# Patient Record
Sex: MALE | Race: ASIAN | NOT HISPANIC OR LATINO | URBAN - METROPOLITAN AREA
[De-identification: names, ages, dates, MRNs, and addresses within clinical notes are randomized per-mention and may not be internally consistent; named-entity substitution may affect disease eponyms.]

---

## 2019-11-29 ENCOUNTER — EMERGENCY (EMERGENCY)
Facility: HOSPITAL | Age: 44
LOS: 1 days | Discharge: HOME | End: 2019-11-29
Attending: EMERGENCY MEDICINE | Admitting: EMERGENCY MEDICINE
Payer: COMMERCIAL

## 2019-11-29 VITALS
DIASTOLIC BLOOD PRESSURE: 144 MMHG | OXYGEN SATURATION: 98 % | RESPIRATION RATE: 16 BRPM | TEMPERATURE: 98 F | HEART RATE: 77 BPM | SYSTOLIC BLOOD PRESSURE: 213 MMHG

## 2019-11-29 LAB
ALBUMIN SERPL ELPH-MCNC: 4.4 G/DL — SIGNIFICANT CHANGE UP (ref 3.3–5)
ALP SERPL-CCNC: 72 U/L — SIGNIFICANT CHANGE UP (ref 40–120)
ALT FLD-CCNC: 22 U/L — SIGNIFICANT CHANGE UP (ref 4–41)
ANION GAP SERPL CALC-SCNC: 12 MMO/L — SIGNIFICANT CHANGE UP (ref 7–14)
APPEARANCE UR: CLEAR — SIGNIFICANT CHANGE UP
AST SERPL-CCNC: 16 U/L — SIGNIFICANT CHANGE UP (ref 4–40)
BASOPHILS # BLD AUTO: 0.06 K/UL — SIGNIFICANT CHANGE UP (ref 0–0.2)
BASOPHILS NFR BLD AUTO: 0.7 % — SIGNIFICANT CHANGE UP (ref 0–2)
BILIRUB SERPL-MCNC: 0.5 MG/DL — SIGNIFICANT CHANGE UP (ref 0.2–1.2)
BILIRUB UR-MCNC: NEGATIVE — SIGNIFICANT CHANGE UP
BLOOD UR QL VISUAL: NEGATIVE — SIGNIFICANT CHANGE UP
BUN SERPL-MCNC: 24 MG/DL — HIGH (ref 7–23)
CALCIUM SERPL-MCNC: 9.1 MG/DL — SIGNIFICANT CHANGE UP (ref 8.4–10.5)
CHLORIDE SERPL-SCNC: 103 MMOL/L — SIGNIFICANT CHANGE UP (ref 98–107)
CO2 SERPL-SCNC: 24 MMOL/L — SIGNIFICANT CHANGE UP (ref 22–31)
COLOR SPEC: SIGNIFICANT CHANGE UP
CREAT SERPL-MCNC: 1.09 MG/DL — SIGNIFICANT CHANGE UP (ref 0.5–1.3)
EOSINOPHIL # BLD AUTO: 0.26 K/UL — SIGNIFICANT CHANGE UP (ref 0–0.5)
EOSINOPHIL NFR BLD AUTO: 3.1 % — SIGNIFICANT CHANGE UP (ref 0–6)
GLUCOSE SERPL-MCNC: 96 MG/DL — SIGNIFICANT CHANGE UP (ref 70–99)
GLUCOSE UR-MCNC: NEGATIVE — SIGNIFICANT CHANGE UP
HBA1C BLD-MCNC: 5.7 % — HIGH (ref 4–5.6)
HCT VFR BLD CALC: 44.5 % — SIGNIFICANT CHANGE UP (ref 39–50)
HGB BLD-MCNC: 14.7 G/DL — SIGNIFICANT CHANGE UP (ref 13–17)
IMM GRANULOCYTES NFR BLD AUTO: 0.2 % — SIGNIFICANT CHANGE UP (ref 0–1.5)
KETONES UR-MCNC: NEGATIVE — SIGNIFICANT CHANGE UP
LEUKOCYTE ESTERASE UR-ACNC: NEGATIVE — SIGNIFICANT CHANGE UP
LYMPHOCYTES # BLD AUTO: 2.7 K/UL — SIGNIFICANT CHANGE UP (ref 1–3.3)
LYMPHOCYTES # BLD AUTO: 32.3 % — SIGNIFICANT CHANGE UP (ref 13–44)
MCHC RBC-ENTMCNC: 28.7 PG — SIGNIFICANT CHANGE UP (ref 27–34)
MCHC RBC-ENTMCNC: 33 % — SIGNIFICANT CHANGE UP (ref 32–36)
MCV RBC AUTO: 86.7 FL — SIGNIFICANT CHANGE UP (ref 80–100)
MONOCYTES # BLD AUTO: 0.63 K/UL — SIGNIFICANT CHANGE UP (ref 0–0.9)
MONOCYTES NFR BLD AUTO: 7.5 % — SIGNIFICANT CHANGE UP (ref 2–14)
NEUTROPHILS # BLD AUTO: 4.68 K/UL — SIGNIFICANT CHANGE UP (ref 1.8–7.4)
NEUTROPHILS NFR BLD AUTO: 56.2 % — SIGNIFICANT CHANGE UP (ref 43–77)
NITRITE UR-MCNC: NEGATIVE — SIGNIFICANT CHANGE UP
NRBC # FLD: 0 K/UL — SIGNIFICANT CHANGE UP (ref 0–0)
PH UR: 5.5 — SIGNIFICANT CHANGE UP (ref 5–8)
PLATELET # BLD AUTO: 290 K/UL — SIGNIFICANT CHANGE UP (ref 150–400)
PMV BLD: 9.2 FL — SIGNIFICANT CHANGE UP (ref 7–13)
POTASSIUM SERPL-MCNC: 3.8 MMOL/L — SIGNIFICANT CHANGE UP (ref 3.5–5.3)
POTASSIUM SERPL-SCNC: 3.8 MMOL/L — SIGNIFICANT CHANGE UP (ref 3.5–5.3)
PROT SERPL-MCNC: 7.9 G/DL — SIGNIFICANT CHANGE UP (ref 6–8.3)
PROT UR-MCNC: 10 — SIGNIFICANT CHANGE UP
RBC # BLD: 5.13 M/UL — SIGNIFICANT CHANGE UP (ref 4.2–5.8)
RBC # FLD: 13.2 % — SIGNIFICANT CHANGE UP (ref 10.3–14.5)
SODIUM SERPL-SCNC: 139 MMOL/L — SIGNIFICANT CHANGE UP (ref 135–145)
SP GR SPEC: 1.02 — SIGNIFICANT CHANGE UP (ref 1–1.04)
TROPONIN T, HIGH SENSITIVITY: 17 NG/L — SIGNIFICANT CHANGE UP (ref ?–14)
TROPONIN T, HIGH SENSITIVITY: 18 NG/L — SIGNIFICANT CHANGE UP (ref ?–14)
UROBILINOGEN FLD QL: NORMAL — SIGNIFICANT CHANGE UP
WBC # BLD: 8.35 K/UL — SIGNIFICANT CHANGE UP (ref 3.8–10.5)
WBC # FLD AUTO: 8.35 K/UL — SIGNIFICANT CHANGE UP (ref 3.8–10.5)

## 2019-11-29 PROCEDURE — 99218: CPT

## 2019-11-29 PROCEDURE — 78452 HT MUSCLE IMAGE SPECT MULT: CPT | Mod: 26

## 2019-11-29 PROCEDURE — 93016 CV STRESS TEST SUPVJ ONLY: CPT | Mod: GC

## 2019-11-29 PROCEDURE — 93018 CV STRESS TEST I&R ONLY: CPT | Mod: GC

## 2019-11-29 PROCEDURE — 71046 X-RAY EXAM CHEST 2 VIEWS: CPT | Mod: 26

## 2019-11-29 RX ORDER — ACETAMINOPHEN 500 MG
975 TABLET ORAL ONCE
Refills: 0 | Status: COMPLETED | OUTPATIENT
Start: 2019-11-29 | End: 2019-11-29

## 2019-11-29 RX ORDER — NIFEDIPINE 30 MG
30 TABLET, EXTENDED RELEASE 24 HR ORAL ONCE
Refills: 0 | Status: COMPLETED | OUTPATIENT
Start: 2019-11-29 | End: 2019-11-29

## 2019-11-29 RX ORDER — NIFEDIPINE 30 MG
1 TABLET, EXTENDED RELEASE 24 HR ORAL
Qty: 14 | Refills: 0
Start: 2019-11-29 | End: 2019-12-12

## 2019-11-29 RX ORDER — HYDROCHLOROTHIAZIDE 25 MG
12.5 TABLET ORAL ONCE
Refills: 0 | Status: COMPLETED | OUTPATIENT
Start: 2019-11-29 | End: 2019-11-29

## 2019-11-29 RX ORDER — LOSARTAN POTASSIUM 100 MG/1
100 TABLET, FILM COATED ORAL ONCE
Refills: 0 | Status: COMPLETED | OUTPATIENT
Start: 2019-11-29 | End: 2019-11-29

## 2019-11-29 RX ORDER — LOSARTAN/HYDROCHLOROTHIAZIDE 100MG-25MG
1 TABLET ORAL
Qty: 14 | Refills: 0
Start: 2019-11-29 | End: 2019-12-12

## 2019-11-29 RX ORDER — SODIUM CHLORIDE 9 MG/ML
1000 INJECTION INTRAMUSCULAR; INTRAVENOUS; SUBCUTANEOUS ONCE
Refills: 0 | Status: COMPLETED | OUTPATIENT
Start: 2019-11-29 | End: 2019-11-29

## 2019-11-29 RX ORDER — DIPHENHYDRAMINE HCL 50 MG
25 CAPSULE ORAL ONCE
Refills: 0 | Status: COMPLETED | OUTPATIENT
Start: 2019-11-29 | End: 2019-11-29

## 2019-11-29 RX ORDER — LIDOCAINE 4 G/100G
1 CREAM TOPICAL ONCE
Refills: 0 | Status: COMPLETED | OUTPATIENT
Start: 2019-11-29 | End: 2019-11-29

## 2019-11-29 RX ORDER — HYDROCHLOROTHIAZIDE 25 MG
12.5 TABLET ORAL DAILY
Refills: 0 | Status: DISCONTINUED | OUTPATIENT
Start: 2019-11-29 | End: 2019-11-30

## 2019-11-29 RX ORDER — ACETAMINOPHEN 500 MG
650 TABLET ORAL ONCE
Refills: 0 | Status: COMPLETED | OUTPATIENT
Start: 2019-11-29 | End: 2019-11-29

## 2019-11-29 RX ORDER — LOSARTAN POTASSIUM 100 MG/1
100 TABLET, FILM COATED ORAL DAILY
Refills: 0 | Status: DISCONTINUED | OUTPATIENT
Start: 2019-11-29 | End: 2019-12-30

## 2019-11-29 RX ORDER — DIAZEPAM 5 MG
5 TABLET ORAL ONCE
Refills: 0 | Status: DISCONTINUED | OUTPATIENT
Start: 2019-11-29 | End: 2019-11-29

## 2019-11-29 RX ORDER — ASPIRIN/CALCIUM CARB/MAGNESIUM 324 MG
162 TABLET ORAL ONCE
Refills: 0 | Status: COMPLETED | OUTPATIENT
Start: 2019-11-29 | End: 2019-11-29

## 2019-11-29 RX ORDER — HYDRALAZINE HCL 50 MG
10 TABLET ORAL ONCE
Refills: 0 | Status: COMPLETED | OUTPATIENT
Start: 2019-11-29 | End: 2019-11-29

## 2019-11-29 RX ORDER — METOCLOPRAMIDE HCL 10 MG
10 TABLET ORAL ONCE
Refills: 0 | Status: COMPLETED | OUTPATIENT
Start: 2019-11-29 | End: 2019-11-29

## 2019-11-29 RX ADMIN — LIDOCAINE 1 PATCH: 4 CREAM TOPICAL at 04:36

## 2019-11-29 RX ADMIN — LIDOCAINE 1 PATCH: 4 CREAM TOPICAL at 08:15

## 2019-11-29 RX ADMIN — LOSARTAN POTASSIUM 100 MILLIGRAM(S): 100 TABLET, FILM COATED ORAL at 07:08

## 2019-11-29 RX ADMIN — Medication 5 MILLIGRAM(S): at 04:36

## 2019-11-29 RX ADMIN — Medication 12.5 MILLIGRAM(S): at 08:35

## 2019-11-29 RX ADMIN — Medication 10 MILLIGRAM(S): at 10:51

## 2019-11-29 RX ADMIN — Medication 30 MILLIGRAM(S): at 19:21

## 2019-11-29 RX ADMIN — Medication 25 MILLIGRAM(S): at 18:50

## 2019-11-29 RX ADMIN — SODIUM CHLORIDE 1000 MILLILITER(S): 9 INJECTION INTRAMUSCULAR; INTRAVENOUS; SUBCUTANEOUS at 17:20

## 2019-11-29 RX ADMIN — Medication 975 MILLIGRAM(S): at 04:36

## 2019-11-29 RX ADMIN — Medication 12.5 MILLIGRAM(S): at 22:37

## 2019-11-29 RX ADMIN — LIDOCAINE 1 PATCH: 4 CREAM TOPICAL at 10:53

## 2019-11-29 RX ADMIN — Medication 975 MILLIGRAM(S): at 06:57

## 2019-11-29 RX ADMIN — Medication 10 MILLIGRAM(S): at 18:30

## 2019-11-29 RX ADMIN — Medication 650 MILLIGRAM(S): at 16:35

## 2019-11-29 RX ADMIN — Medication 650 MILLIGRAM(S): at 17:10

## 2019-11-29 RX ADMIN — Medication 162 MILLIGRAM(S): at 04:37

## 2019-11-29 NOTE — ED ADULT TRIAGE NOTE - CHIEF COMPLAINT QUOTE
Patient complains of high blood pressure (sbp 200s at home), in setting of posterior neck pain x 2 weeks.  Patient states had chest pain x1 episode yesterday, but none at present.  Denies headache. PHx of HTN on Losartan-HCTZ 100mg, states he is compliant and no recent change in prescription.  /144 in triage.  EKG obtained.

## 2019-11-29 NOTE — ED CDU PROVIDER INITIAL DAY NOTE - OBJECTIVE STATEMENT
45 yo M PMH of HTN take Losartan/HCTZ 100mg p/w elevated BP with intermittent left sided neck pain x 1 week and non exertion L. chest pain with SOB x 1 day. Pt reports that his neck pain is not reproducible but states that due to his job as a  his neck is placed in bad positions for along period of time. Pt report that his CP is nonradiating and sharp, not associated with inspiration, position, exertion or food consumption.  Pt state that his when he took his Bp yesterday it was elevated from 185 to 210 systolic with his diastolic around 115. Pt baseline BP is stated to be 150/85.  Pt denies nausea, vomiting, weakness, fatigue, lightheadedness.  Pt has had no recent long trips, surgery, trauma, denies hemoptysis, and has no hx of DVT/PE.  Pt also denies fevers, chills cough.   PCP: Dr. Salcido    CDU NATACHA Little: Agree with above note: 45 yo M with htn, here with htn and left neck pain and left cp, position, worst with movement, non exertional. mild associated sob. no SAMANO. x 1 day. trop 17,18, EKG TWI I, avl, v4,5,6, no previous ekg. Fam h/o CAD stents in father. CDU for tele stress, tele doc of day.

## 2019-11-29 NOTE — ED CDU PROVIDER INITIAL DAY NOTE - PROGRESS NOTE DETAILS
During pts stay, was called by stress lab that patients BP was too elevated to perform stress. In the CDU was given medications and BP improved was able to have stress. Seen by Dr. Bassett who recommended outpatient follow up as stress was normal. recommends increasing hctz to 20 mg and adding on nifedipine 30mg to help control BP outpatient. all copies of reports given to patient, advised close followup. Strict return precautions given. Patient c/o throbbing headache. Neuro exam intact. Plan for reglan and reassess.

## 2019-11-29 NOTE — ED CDU PROVIDER INITIAL DAY NOTE - ATTENDING CONTRIBUTION TO CARE
Patient is a 43 yo M with hx of HTN sent to CDU for stress test secondary to family history of CAD, ekg with twi in lateral leads and left sided chest pain. Pain is worse with laying on left side. Denies exertional chest pain, shortness of breath, lower extremity swelling. No travel. No history of DVT/ PE. Denies fevers, chills, cough. Patient also c/o left sided neck pain.     VS noted  Gen. no acute distress, Non toxic   HEENT: EOMI, mmm, mild left paracervical tenderness on left, supple neck, full ROM  Lungs: CTAB/L no C/ W /R   CVS: RRR   Abd; Soft non tender, non distended   Ext: no edema, no calf tenderness  Skin: no rash  Neuro AAOx3, CN 2-12 intact, strength equal in b/l UE/LE, clear speech  a/p: chest pain, abnormal ekg, fam hx of CAD - plan for stress test, tele doc evaluation.   - Betty MCLEAN

## 2019-11-29 NOTE — ED CDU PROVIDER INITIAL DAY NOTE - MEDICAL DECISION MAKING DETAILS
43 yo M with htn, here with htn and left neck pain and left cp, position, worst with movement, non exertional. mild associated sob. no SAMANO. x 1 day. trop 17,18, EKG TWI I, avl, v4,5,6, no previous ekg. Fam h/o CAD stents in father. CDU for tele stress, tele doc of day. no active pain, neck pain improved.

## 2019-11-29 NOTE — ED ADULT NURSE NOTE - OBJECTIVE STATEMENT
Pt arrives for hypertension, stating it was in the 200s systolic at home. He also reports left sided chest pain only upon palpation, left neck pain, SOB. Denies dizziness/nausea/vomiting.  Reports Hx hypertension and states he takes his medication as prescribed. Pt placed on monitor. Father at bedside. Will continue to monitor

## 2019-11-29 NOTE — ED PROVIDER NOTE - NS ED ROS FT
GENERAL: No fever or chills, EYES: no change in vision, HEENT: + neck pain, no trouble speaking, CARDIAC: + chest pain, palpitation PULMONARY: no cough or + OB, GI: no abdominal pain, no nausea, no vomiting, no diarrhea or constipation, : No changes in urination, SKIN: no rashes, NEURO: no headache,  MSK: No muscle pain ~Dorie Fallon MD

## 2019-11-29 NOTE — ED PROVIDER NOTE - ATTENDING CONTRIBUTION TO CARE
MD Shankar:  I performed a face to face bedside interview with patient regarding history of present illness, review of symptoms and past medical history. I completed an independent physical exam(documented below).  I have discussed patient's plan of care with resident.   I agree with note as stated above, having amended the EMR as needed to reflect my findings. I have discussed the assessment and plan of care.  This includes during the time I functioned as the attending physician for this patient.  PE:  Gen: Alert, NAD  Head: NC, AT,  EOMI, normal lids/conjunctiva  ENT:  normal hearing, patent oropharynx without erythema/exudate  Neck: +supple, +L paracervical ttp, +Trachea midline  Chest: no chest wall tenderness, equal chest rise  Pulm: Bilateral BS, normal resp effort, no wheeze/stridor/retractions  CV: RRR, no M/R/G, +dist pulses  Abd: +BS, soft, NT/ND  Rectal: deferred  Mskel: no edema/erythema/cyanosis  Skin: no rash  Neuro: AAOx3, no sensory/motor deficits, CN 2-12 intact   MDM:   45yo M w/ pmh of htn (on losartan/hctz) c/o non-exertional cp w/ sob X 1day and intermittent L neck pain X 1 wk. +Family hx of heart dx, and pt's ecg showing diffuse TWI w/o prior ecg to compare to. Labs including trops, likely CDU for tele monitoring and stress test.

## 2019-11-29 NOTE — ED PROVIDER NOTE - PHYSICAL EXAMINATION
Gen: AAOx3, non-toxic  Head: NCAT  HEENT: EOMI, PERRLA, oral mucosa moist, normal conjunctiva, mild TTP of the left sternocleidomastoid   Lung: CTAB, no respiratory distress, no wheezes/rhonchi/rales B/L, speaking in full sentences  CV: RRR, no murmurs, rubs or gallops  Abd: soft, NTND, no guarding, no CVA tenderness, no rebound tenderness  MSK: no visible deformities, full range of motion of all 4 exts, no edema LE b/l  Neuro: No focal sensory or motor deficits  Skin: Warm, well perfused, no rash  Psych: normal affect.   ~Dorie Fallon MD

## 2019-11-29 NOTE — ED PROVIDER NOTE - OBJECTIVE STATEMENT
Dorie Fallon MD: 43 yo M PMH of HTN take Losartan/HCTZ 100mg p/w elevated BP with intermittent left sided neck pain x 1 week and non exertion L. chest pain with SOB x 1 day. Pt reports that he felt Dorie Fallon MD: 43 yo M PMH of HTN take Losartan/HCTZ 100mg p/w elevated BP with intermittent left sided neck pain x 1 week and non exertion L. chest pain with SOB x 1 day. Pt reports that his neck pain is not reproducible but states that due to his job as a  his neck is placed in bad positions for along period of time. Pt report that his CP is nonradiating and sharp, not associated with inspiration, position, exertion or food consumption.  Pt state that his when he took his Bp yesterday it was elevated from 185 to 210 systolic with his diastolic around 115. Pt baseline BP is stated to be 150/85.  Pt denies nausea, vomiting, weakness, fatigue, lightheadedness.  Pt has had no recent long trips, surgery, trauma, denies hemoptysis, and has no hx of DVT/PE.  Pt also denies fevers, chills cough.     PCP: Dr. Salcido

## 2019-11-29 NOTE — CONSULT NOTE ADULT - SUBJECTIVE AND OBJECTIVE BOX
Robinson Guevara MD  Interventional Cardiology / Endovascular Specialist  [place] Office : [phone] [place] [address] [place] [address]  Tel:   [place] Office : [address] [facility] [place] [address]  Tel: [phone]  Cell : [phone]      HISTORY OF PRESENTING ILLNESS:    43 yo M PMH of HTN take Losartan/HCTZ 100mg p/w elevated BP with intermittent left sided neck pain x 1 week and non exertion L. chest pain with SOB x 1 day. Pt reports that his neck pain is not reproducible but states that due to his job as a  his neck is placed in bad positions for along period of time. Pt report that his CP is nonradiating and sharp, not associated with inspiration, position, exertion or food consumption.  Pt state that his when he took his Bp yesterday it was elevated from 185 to 210 systolic with his diastolic around 115. Pt baseline BP is stated to be 150/85.  Pt denies nausea, vomiting, weakness, fatigue, lightheadedness.  Pt has had no recent long trips, surgery, trauma, denies hemoptysis, and has no hx of DVT/PE.  Pt also denies fevers, chills cough.   	    PAST MEDICAL & SURGICAL HISTORY:  Hyperlipidemia      SOCIAL HISTORY: Substance Use (street drugs): ( x ) never used  (  ) other:    FAMILY HISTORY:      REVIEW OF SYSTEMS:  CONSTITUTIONAL: No fever, weight loss, or fatigue  EYES: No eye pain, visual disturbances, or discharge  ENMT:  No difficulty hearing, tinnitus, vertigo; No sinus or throat pain  BREASTS: No pain, masses, or nipple discharge  GASTROINTESTINAL: No abdominal or epigastric pain. No nausea, vomiting, or hematemesis; No diarrhea or constipation. No melena or hematochezia.  GENITOURINARY: No dysuria, frequency, hematuria, or incontinence  NEUROLOGICAL: No headaches, memory loss, loss of strength, numbness, or tremors  ENDOCRINE: No heat or cold intolerance; No hair loss  MUSCULOSKELETAL: No joint pain or swelling; No muscle, back, or extremity pain  PSYCHIATRIC: No depression, anxiety, mood swings, or difficulty sleeping  HEME/LYMPH: No easy bruising, or bleeding gums  All others negative    MEDICATIONS:  hydrochlorothiazide 12.5 milliGRAM(s) Oral daily  losartan 100 milliGRAM(s) Oral daily                  FAMILY HISTORY:        Allergies    No Known Allergies    Intolerances    	      PHYSICAL EXAM:  T(C): 36.3 (11-29-19 @ 08:41), Max: 37.1 (11-29-19 @ 07:49)  HR: 72 (11-29-19 @ 11:10) (60 - 79)  BP: 167/99 (11-29-19 @ 11:10) (150/99 - 213/144)  RR: 14 (11-29-19 @ 08:41) (14 - 16)  SpO2: 99% (11-29-19 @ 08:41) (97% - 100%)  Wt(kg): --  I&O's Summary      GENERAL: NAD, well-groomed, well-developed  EYES: EOMI, PERRLA, conjunctiva and sclera clear  ENMT: No tonsillar erythema, exudates, or enlargement; Moist mucous membranes, Good dentition, No lesions  Cardiovascular: Normal S1 S2, No JVD, No murmurs, No edema  Respiratory: Lungs clear to auscultation	  Gastrointestinal:  Soft, Non-tender, + BS	  Extremities: Normal range of motion, No clubbing, cyanosis or edema  LYMPH: No lymphadenopathy noted  NERVOUS SYSTEM:  Alert & Oriented X3, Good concentration; Motor Strength 5/5 B/L upper and lower extremities; DTRs 2+ intact and symmetric      LABS:	 	    CARDIAC MARKERS:                                  14.7   8.35  )-----------( 290      ( 29 Nov 2019 04:30 )             44.5     11-29    139  |  103  |  24<H>  ----------------------------<  96  3.8   |  24  |  1.09    Ca    9.1      29 Nov 2019 04:30    TPro  7.9  /  Alb  4.4  /  TBili  0.5  /  DBili  x   /  AST  16  /  ALT  22  /  AlkPhos  72  11-29    proBNP:   Lipid Profile:   HgA1c: Hemoglobin A1C, Whole Blood: 5.7 % (11-29 @ 04:30)    TSH:     Consultant(s) Notes Reviewed:  [x ] YES  [ ] NO    Care Discussed with Consultants/Other Providers [ x] YES  [ ] NO    Imaging Personally Reviewed independently:  [x] YES  [ ] NO    All labs, radiologic studies, vitals, orders and medications list reviewed. Patient is seen and examined at bedside. Case discussed with medical team.    ASSESSMENT/PLAN:

## 2019-11-29 NOTE — ED PROVIDER NOTE - CLINICAL SUMMARY MEDICAL DECISION MAKING FREE TEXT BOX
Dorie Fallon MD: 45 yo M PMH of HTN take Losartan/HCTZ 100mg p/w elevated BP with intermittent left sided neck pain x 1 week and non exertion L. chest pain with SOB x 1 day. Pt BP currently 185/112 Exam show TTP of neck. Neck pain maybe due to MSK pain will give pain med and lido patch and reassess pain and b/p. Pt c/p with r/o ACS with labs and cxr will also give . If b/p still elevated and pain persist will further investigate neck pain with imaging and give BP meds for concern for hypertensive emergency

## 2019-11-29 NOTE — CONSULT NOTE ADULT - ASSESSMENT
EKG SR LVH lateral TWI     Stress pending     Assessment and Plan     1) Chest pain : atypical can be 2/2 uncontrolled HTN, stress pending , BP not controlled , s/p losartan/HCTZ . can give Hydra; 10 mg IV to facilitate Stress c/w asa     2) HTN: on HCTZ and Losartan , increase HCTZ to 25 mg po daily d/c with adding nifedipine 30 mg po daily if stress normal  f/o as op pt does not follow with anyone     3) DVT PPX ambulation

## 2019-11-30 VITALS
HEART RATE: 83 BPM | TEMPERATURE: 98 F | RESPIRATION RATE: 16 BRPM | OXYGEN SATURATION: 99 % | DIASTOLIC BLOOD PRESSURE: 100 MMHG | SYSTOLIC BLOOD PRESSURE: 148 MMHG

## 2019-11-30 LAB
TSH SERPL-MCNC: 1.63 UIU/ML — SIGNIFICANT CHANGE UP (ref 0.27–4.2)
TSH SERPL-MCNC: 2.15 UIU/ML — SIGNIFICANT CHANGE UP (ref 0.27–4.2)

## 2019-11-30 PROCEDURE — 74174 CTA ABD&PLVS W/CONTRAST: CPT | Mod: 26

## 2019-11-30 PROCEDURE — 99217: CPT

## 2019-11-30 PROCEDURE — 71275 CT ANGIOGRAPHY CHEST: CPT | Mod: 26

## 2019-11-30 PROCEDURE — 70450 CT HEAD/BRAIN W/O DYE: CPT | Mod: 26

## 2019-11-30 RX ORDER — SODIUM CHLORIDE 9 MG/ML
1000 INJECTION INTRAMUSCULAR; INTRAVENOUS; SUBCUTANEOUS ONCE
Refills: 0 | Status: COMPLETED | OUTPATIENT
Start: 2019-11-30 | End: 2019-11-30

## 2019-11-30 RX ORDER — ACETAMINOPHEN 500 MG
650 TABLET ORAL ONCE
Refills: 0 | Status: COMPLETED | OUTPATIENT
Start: 2019-11-30 | End: 2019-11-30

## 2019-11-30 RX ORDER — HYDROCHLOROTHIAZIDE 25 MG
25 TABLET ORAL DAILY
Refills: 0 | Status: DISCONTINUED | OUTPATIENT
Start: 2019-11-30 | End: 2019-12-30

## 2019-11-30 RX ADMIN — LOSARTAN POTASSIUM 100 MILLIGRAM(S): 100 TABLET, FILM COATED ORAL at 06:34

## 2019-11-30 RX ADMIN — Medication 650 MILLIGRAM(S): at 10:17

## 2019-11-30 RX ADMIN — Medication 25 MILLIGRAM(S): at 06:34

## 2019-11-30 RX ADMIN — SODIUM CHLORIDE 1000 MILLILITER(S): 9 INJECTION INTRAMUSCULAR; INTRAVENOUS; SUBCUTANEOUS at 10:17

## 2019-11-30 NOTE — ED CDU PROVIDER SUBSEQUENT DAY NOTE - ATTENDING CONTRIBUTION TO CARE
ED Attending (Dr Concepcion): I have personally performed a face to face bedside history and physical examination of this patient.  I have discussed the case with the PA and  I have personally authored the following components: HPI, ROS, Physical Exam and MDM in addition to reviewing and revising the rest of the Provider Note. Exam grossly WNL. Tele monitoring, continued BP/vitals monitoring; general observation care / monitoring.

## 2019-11-30 NOTE — ED CDU PROVIDER DISPOSITION NOTE - PATIENT PORTAL LINK FT
You can access the FollowMyHealth Patient Portal offered by [facility] by registering at the following website: [email or web address]. By joining [facility]’s FollowMyHealth portal, you will also be able to view your health information using other applications (apps) compatible with our system. You can access the FollowMyHealth Patient Portal offered by [facility] by registering at the following website: [email or web address]. By joining [facility]’s FollowMyHealth portal, you will also be able to view your health information using other applications (apps) compatible with our system.

## 2019-11-30 NOTE — ED CDU PROVIDER DISPOSITION NOTE - NSFOLLOWUPINSTRUCTIONS_ED_ALL_ED_FT
Take [id number] once daily. Take Hydrochlorthiazide 25mg once daily. Take Nefedipine 30mg once daily. See your regular doctor/cardiology within 72 hours for follow-up care. Return to ER for new or worsening symptoms. Take Losartan/Hydrochlorthiazide 100/25mg once daily. Take Nefedipine 30mg once daily. See your regular doctor/cardiology within 72 hours for follow-up care. Return to ER for new or worsening symptoms.

## 2019-11-30 NOTE — ED CDU PROVIDER SUBSEQUENT DAY NOTE - PROGRESS NOTE DETAILS
70
CDU Att PN: Jamey  Pt is feeling better, states pain gone, but has persistent tachycardia which goes up to 130's+ on movement in bed and resting in 110's.  Will obtain TSH, provide additional IVF and reassess. I have personally performed a face to face evaluation of this patient including review of the history and focused physical exam.  I have discussed the case and reviewed the plan of care with the PA.

## 2019-11-30 NOTE — ED CDU PROVIDER DISPOSITION NOTE - ADDITIONAL NOTES AND INSTRUCTIONS:
The patient was seen and treated at [facility] on 11/29/19-11/30/19. Recommend 3 days rest. May return to work on 12/4/19. The patient was seen and treated at [facility] on 11/29/19-11/30/19. Recommends 3 days rest. May return to work on 12/4/19.

## 2019-11-30 NOTE — ED CDU PROVIDER SUBSEQUENT DAY NOTE - HISTORY
45 yo male, PMH HTN (on losartan/HCTZ 100/12.5 daily) and hyperlipidemia, presented to the ED for left neck and chest pain.  In the ED, CBC/CMP unremarkable; trop x 2:  17 ---> 18; EKG NSR with no acutely concerning findings; CXR unremarkable.  Pt. was dispo'd to CDU for tele monitoring and stress test.  On 11/29, stress test was completed (was normal study with no evidence for myocardial infarction or  ischemia); pt was evaluated by cardiologist Dr. Robinson Guevara who advised to increase HCTZ component of BP med from 12.5 milligrams to 25 milligrams daily, and start nifedipine 30 milligrams daily.  Pt. had c/o headache in latter part of day 11/29, was given supportive care with Tylenol, and afterwards, Reglan (with Benadryl).  Pt was also given first dose of nifedipine.  Headache improved, but pt still with elevated blood pressure in evening (pt denied headache, vision changes, chest pain/discomfort, SOB/dyspnea, other pain/discomfort, or any other physical complaints); additional HCTZ 12.5 milligrams was given (total daily dose 11/29/19 was HCTZ 25 milligrams).  CT head was performed; no acute pathology was noted.  In the interim, pt objectively noted to be resting comfortably; no c/o thus far.  Plan for continued BP/vitals monitoring.

## 2019-11-30 NOTE — ED CDU PROVIDER DISPOSITION NOTE - CLINICAL COURSE
CDU Att DC Note: Jamey  Pt p/w high BP and HA which have resolved.  While in CDU he had persistent tachycardia but no acute source was found.  CTA and TSH WNL.  At rest HR around 95 but increases to 120 with miniaml exertion.  NOw feeling well and wants to go home.  Anticipatory guidance provided and pt to f/u c his primary.  I have personally performed a face to face evaluation of this patient including review of the history and focused physical exam.  I have discussed the case and reviewed the plan of care with the PA.

## 2019-11-30 NOTE — ED CDU PROVIDER DISPOSITION NOTE - CARE PROVIDER_API CALL
Robinson Guevara (MD)  Cardiovascular Disease; Nuclear Cardiology  [address] [place] [address]  Phone: [phone]  Fax: [phone]  Follow Up Time:

## 2019-11-30 NOTE — ED CDU PROVIDER SUBSEQUENT DAY NOTE - MEDICAL DECISION MAKING DETAILS
Tele monitoring, continued BP/vitals monitoring; general observation care / monitoring. Exam grossly WNL. Tele monitoring, continued BP/vitals monitoring; general observation care / monitoring.

## 2019-11-30 NOTE — ED CDU PROVIDER SUBSEQUENT DAY NOTE - CONSTITUTIONAL, MLM
normal... Well appearing, awake, alert, oriented to person, place, time/situation and in no apparent distress.  Pt. verbalizing in full, clear, effortless sentences.
